# Patient Record
Sex: FEMALE | Race: WHITE
[De-identification: names, ages, dates, MRNs, and addresses within clinical notes are randomized per-mention and may not be internally consistent; named-entity substitution may affect disease eponyms.]

---

## 2018-01-10 ENCOUNTER — HOSPITAL ENCOUNTER (EMERGENCY)
Dept: HOSPITAL 74 - FER | Age: 28
Discharge: HOME | End: 2018-01-10
Payer: COMMERCIAL

## 2018-01-10 VITALS — HEART RATE: 70 BPM | DIASTOLIC BLOOD PRESSURE: 65 MMHG | SYSTOLIC BLOOD PRESSURE: 113 MMHG | TEMPERATURE: 98.8 F

## 2018-01-10 VITALS — BODY MASS INDEX: 30.8 KG/M2

## 2018-01-10 DIAGNOSIS — R07.89: Primary | ICD-10-CM

## 2018-01-10 LAB
ALBUMIN SERPL-MCNC: 3.7 G/DL (ref 3.5–5)
ALP SERPL-CCNC: 71 U/L (ref 32–92)
ALT SERPL-CCNC: 17 U/L (ref 10–40)
ANION GAP SERPL CALC-SCNC: 8 MMOL/L (ref 8–16)
AST SERPL-CCNC: 19 U/L (ref 10–42)
BACTERIA #/AREA URNS HPF: (no result) /HPF
BASOPHILS # BLD: 1.4 % (ref 0–2)
BILIRUB SERPL-MCNC: 0.2 MG/DL (ref 0.2–1)
BUN SERPL-MCNC: 18 MG/DL (ref 7–18)
CALCIUM SERPL-MCNC: 8.7 MG/DL (ref 8.4–10.2)
CHLORIDE SERPL-SCNC: 105 MMOL/L (ref 98–107)
CO2 SERPL-SCNC: 24 MMOL/L (ref 22–28)
COLOR UR: YELLOW
CREAT SERPL-MCNC: 0.7 MG/DL (ref 0.6–1.3)
DEPRECATED RDW RBC AUTO: 12.7 % (ref 11.6–15.6)
EOSINOPHIL # BLD: 2.5 % (ref 0–4.5)
EPITH CASTS URNS QL MICRO: (no result) /HPF
GLUCOSE SERPL-MCNC: 116 MG/DL (ref 74–106)
HCG UR QL: NEGATIVE
HCT VFR BLD CALC: 38.2 % (ref 32.4–45.2)
HGB BLD-MCNC: 12.7 GM/DL (ref 10.7–15.3)
KETONES UR QL STRIP: (no result)
LYMPHOCYTES # BLD: 31.5 % (ref 8–40)
MCH RBC QN AUTO: 28.1 PG (ref 25.7–33.7)
MCHC RBC AUTO-ENTMCNC: 33.3 G/DL (ref 32–36)
MCV RBC: 84.5 FL (ref 80–96)
MONOCYTES # BLD AUTO: 4.9 % (ref 3.8–10.2)
NEUTROPHILS # BLD: 59.7 % (ref 42.8–82.8)
PH UR: 6 [PH] (ref 4.5–8)
PLATELET # BLD AUTO: 332 K/MM3 (ref 134–434)
PMV BLD: 8.1 FL (ref 7.5–11.1)
POTASSIUM SERPLBLD-SCNC: 3.6 MMOL/L (ref 3.5–5.1)
PROT SERPL-MCNC: 6.3 G/DL (ref 6.4–8.3)
RBC # BLD AUTO: (no result) /HPF (ref 0–3)
RBC # BLD AUTO: 4.52 M/MM3 (ref 3.6–5.2)
RBC # UR STRIP: (no result) /UL
SODIUM SERPL-SCNC: 137 MMOL/L (ref 136–145)
SP GR UR: 1.02 (ref 1–1.02)
TROPONIN I SERPL-MCNC: 0.1 NG/ML (ref 0.03–0.5)
UROBILINOGEN UR STRIP-MCNC: 0.2 MG/DL (ref 0.2–1)
WBC # BLD AUTO: 8.8 K/MM3 (ref 4–10.8)
WBC # UR AUTO: (no result) /UL (ref 0–5)

## 2018-01-10 PROCEDURE — 3E0333Z INTRODUCTION OF ANTI-INFLAMMATORY INTO PERIPHERAL VEIN, PERCUTANEOUS APPROACH: ICD-10-PCS

## 2018-01-10 NOTE — PDOC
History of Present Illness





- General


History Source: Patient


Exam Limitations: No Limitations





- History of Present Illness


Initial Comments: 





01/10/18 20:00


The patient is a 27 year old female, with a significant past medical history of 

asthma, who presents to the emergency department with, two days of chest pain 

and left arm pain. The patient reports to have visited her endocrinologist Dr. Gaurav Sy and reported her chest pain, palpitations who performed a 

cardiogram and advised her to visit the ED for further evaluation. She reports 

her left arm pain and it is hard to move. She reports feels a pulling in her 

back when she moves her hand. Secondary to her symptoms, she reports pain upon 

deep inspiration and SOB. She reports to have taken Tylenol without relief at 

2pm.


She denies recent fevers, chills, headache or dizziness. She denies recent 

nausea, vomit, diarrhea or constipation. She denies recent  dysuria, frequency, 

urgency or hematuria. 


PAST MEDICAL HISTORY:  Asthma


PAST SURGICAL HISTORY:  no significant history


FAMILY HISTORY:  no pertinent history


SOCIAL HISTORY:  Pt lives with  family and is employed.


MEDICATIONS:  reviewed


ALLERGIES:  As per nursing notes








ROS:





General:  No fevers or chills, no weakness, no weight loss 


HEENT: No change in vision.  No sore throat,. No ear pain


CardioVascular:  +Chest pain. +SOB


Respiratory:No cough, or wheezing. 


Gastrointestinal:  no nausea, vomiting, diarrhea or constipation,  No rectal 

bleeding


Genitourinary:  No dysuria, hematuria, or frequency


Musculoskeletal:  No joint or muscle pain or swelling


Extremities: +Left arm pain when moving.


Neurologic: No headache, vertigo, dizziness or loss of consciousness


Psychiatric: nor depression 


Skin: No rashes or easy bruising


Endocrine: no increased thirst or abnormal weight change


Allergic: no skin or latex allergy


All other systems reviewed and normal








Physical Exam:





General: Well-nourished well-developed individual, no acute distress


HEENT: Throat: Normal, tonsils normal, no erythema or exudate


Neck: Supple, no meningeal signs, no lymphadenopathy


Eyes::Pupils equal reactive and round, extraocular motion intact


Chest: +Anterior chest wall pain reproduced on palpation. 


Cardiac: S1-S2 normal, regular rate and rhythm, no murmurs rubs or gallops


Respiratory: Lungs clear to auscultation bilateral


Abdomen: Soft, nondistended, normal bowel sounds, nontender to palpation 

diffusely


Extremities: Warm, dry, no cyanosis, clubbing, or edema


Skin: No rashes


Neuro: Alert and oriented x3, nonfocal exam, grossly intact, normal gait


Psych: Normal mood and affect








<Jennifer Sims - Last Filed: 01/10/18 20:00>





- General


History Source: Patient


Exam Limitations: No Limitations





- History of Present Illness


Initial Comments: 


  medical decision making


This is a 27-year-old healthy female who comes in from her primary care doctor'

s office for evaluation of skeletal muscle anterior chest wall pain. Patient 

has been having several days of palpitations and chest pain. Pain is much worse 

with movement respiration and use of the anterior chest wall muscles.


Patient denies any associated symptoms of shortness of breath nausea vomiting 

diaphoresis or radiation to the back or neck


Patient was at her primary care doctor's and had an EKG there and was told to 

come to the ER for evaluation


Patient has no cardiac risk factors, and it is very unlikely that this is 

cardiac in origin most likely skeletal muscle.


We'll obtain EKG, cardiac enzymes and basic labs. Will medicate with an anti-

inflammatory Toradol


We'll reassess and follow up results, 








A portion of this note was documented by scribe services under my direction. I 

have reviewed the details of the note, within reason, and agree with the 

documentation.  The case summary and management plan written by me. 











01/10/18 20:50


Reassessment patient feels better post Toradol. Patient heart score is 0





Patient's EKG is normal





Patient's labs including troponin are normal.





Patient was reassured and told to take an anti-inflammatory such as Aleve and 

follow-up with her doctor tomorrow patient given copies of her blood work and 

EKG here  there is no suspicion of her pain  cardiac in origin. Pain appears to 

be skeletal muscle or possibly costochondritis. There also appears to be an 

anxiety component to her symptoms given the palpitations.








<Miroslava Rey - Last Filed: 01/10/18 20:53>





- General


Chief Complaint: Pain, Acute


Stated Complaint: CHEST WALL PAIN


Time Seen by Provider: 01/10/18 19:13





Past History





<Jennifer Sims - Last Filed: 01/10/18 20:00>





- Past Medical History


Asthma: Yes


COPD: No





- Suicide/Smoking/Psychosocial Hx


Smoking History: Never smoked


Have you smoked in the past 12 months: No


Hx Alcohol Use: No


Drug/Substance Use Hx: No





<Miroslava Rey - Last Filed: 01/10/18 20:53>





- Past Medical History


Allergies/Adverse Reactions: 


 Allergies











Allergy/AdvReac Type Severity Reaction Status Date / Time


 


No Known Allergies Allergy   Verified 12/27/16 02:00











Home Medications: 


Ambulatory Orders





Albuterol Sulfate Inhaler - [Ventolin HFA Inhaler -] 2 inh PO Q4H #1 inh 12/27/ 16 











*Physical Exam





- Vital Signs


 Last Vital Signs











Temp Pulse Resp BP Pulse Ox


 


 98.8 F   70   16   113/65   100 


 


 01/10/18 19:07  01/10/18 19:07  01/10/18 19:07  01/10/18 19:07  01/10/18 19:07














<Jennifer Sims - Last Filed: 01/10/18 20:00>





- Vital Signs


 Last Vital Signs











Temp Pulse Resp BP Pulse Ox


 


 98.8 F   70   16   113/65   100 


 


 01/10/18 19:07  01/10/18 19:07  01/10/18 19:07  01/10/18 19:07  01/10/18 19:07














<Miroslava Rey - Last Filed: 01/10/18 20:53>





**Heart Score/ECG Review





- History


History: Slightly suspicious





- Electrocardiogram


EKG: Normal





- Age


Age: </= 45





- Risk Factors


Based on the list above the patient has:: No risk factors known





- Troponin


Troponin: </= normal limit





- Score


Heart Score - Total: 0





<Miroslava Rey - Last Filed: 01/10/18 20:53>





ED Treatment Course





- LABORATORY


CBC & Chemistry Diagram: 


 01/10/18 19:50





 01/10/18 19:50





- Medications


Given in the ED: 


ED Medications














Discontinued Medications














Generic Name Dose Route Start Last Admin





  Trade Name Freq  PRN Reason Stop Dose Admin


 


Ketorolac Tromethamine  60 mg  01/10/18 19:23  01/10/18 19:54





  Toradol Injection -  IM  01/10/18 19:24  Not Given





  ONCE ONE   


 


Ketorolac Tromethamine  30 mg  01/10/18 19:32  01/10/18 19:55





  Toradol Injection -  IVPUSH  01/10/18 19:33  30 mg





  ONCE ONE   Administration














<Jennifer Sims - Last Filed: 01/10/18 20:00>





- LABORATORY


CBC & Chemistry Diagram: 


 01/10/18 19:50





 01/10/18 19:50





<Miroslava Rey - Last Filed: 01/10/18 20:53>





*DC/Admit/Observation/Transfer





- Attestations


Scribe Attestion: 





01/10/18 20:01





Documentation prepared by Jennifer Sims, acting as medical scribe for 

Miroslava Rey MD.





<Jennifer Sims - Last Filed: 01/10/18 20:00>





- Discharge Dispostion


Admit: No


Decision to Admit order Date/Time: 





01/10/18 20:13


For the pain take Aleve 2 tablets twice a day with food don't take on an empty 

stomach.





Keep your appointment with your primary care doctor for tomorrow.





Return to the emergency department immediately with ANY new, persistent or 

worsening symptoms.





Continue any medications as previously prescribed by your physician.


.


Please make sure your doctor reviews the results of your emergency evaluation.





Thank you for coming to the   Emergency Department today for your care. It was 

a pleasure to see you today. Please note that your evaluation is INCOMPLETE 

until you  follow-up with your doctor. 











<Miroslava Rey - Last Filed: 01/10/18 20:53>


Diagnosis at time of Disposition: 


 Acute chest wall pain








- Discharge Dispostion


Disposition: HOME


Condition at time of disposition: Stable





- Referrals


Referrals: 


Gaurav Sy MD [Primary Care Provider] - 





- Patient Instructions


Additional Instructions: 


For the pain take Aleve 2 tablets twice a day with food don't take on an empty 

stomach.





Follow-up with your Dr. Dr. Sy's tomorrow.





Take copies of your blood work and workup that we did here in the emergency 

room with you to Dr. Sy tomorrow.





Return to the emergency department immediately with ANY new, persistent or 

worsening symptoms.





Continue any medications as previously prescribed by your physician.





You should follow up with your primary doctor as soon as possible regarding 

today's emergency department visit.


.


Please make sure your doctor reviews the results of your emergency evaluation.





Thank you for coming to the   Emergency Department today for your care. It was 

a pleasure to see you today. Please note that your evaluation is INCOMPLETE 

until you  follow-up with your doctor. 











- Post Discharge Activity

## 2018-01-12 NOTE — EKG
Test Reason : 

Blood Pressure : ***/*** mmHG

Vent. Rate : 076 BPM     Atrial Rate : 076 BPM

   P-R Int : 146 ms          QRS Dur : 080 ms

    QT Int : 414 ms       P-R-T Axes : 030 005 012 degrees

   QTc Int : 465 ms

 

NORMAL SINUS RHYTHM WITH SINUS ARRHYTHMIA

MINIMAL VOLTAGE CRITERIA FOR LVH, MAY BE NORMAL VARIANT

BORDERLINE ECG

Confirmed by MD DARIEL, SVEN ([id number]) on 1/12/2018 10:13:26 AM

 

Referred By: MD CELAYA           Confirmed By:SVEN VIEIRA MD